# Patient Record
Sex: MALE | Race: WHITE | NOT HISPANIC OR LATINO | Employment: UNEMPLOYED | URBAN - METROPOLITAN AREA
[De-identification: names, ages, dates, MRNs, and addresses within clinical notes are randomized per-mention and may not be internally consistent; named-entity substitution may affect disease eponyms.]

---

## 2019-07-10 ENCOUNTER — HOSPITAL ENCOUNTER (EMERGENCY)
Facility: HOSPITAL | Age: 34
Discharge: HOME | End: 2019-07-10
Attending: EMERGENCY MEDICINE | Admitting: EMERGENCY MEDICINE

## 2019-07-10 VITALS
HEIGHT: 75 IN | BODY MASS INDEX: 18.65 KG/M2 | RESPIRATION RATE: 16 BRPM | DIASTOLIC BLOOD PRESSURE: 84 MMHG | HEART RATE: 90 BPM | WEIGHT: 150 LBS | SYSTOLIC BLOOD PRESSURE: 128 MMHG | OXYGEN SATURATION: 99 % | TEMPERATURE: 97.8 F

## 2019-07-10 DIAGNOSIS — F31.9 BIPOLAR AFFECTIVE DISORDER, REMISSION STATUS UNSPECIFIED (HCC): Primary | ICD-10-CM

## 2019-07-10 PROCEDURE — 99282 EMERGENCY DEPT VISIT SF MDM: CPT

## 2019-07-10 RX ORDER — LITHIUM CARBONATE 150 MG/1
450 CAPSULE ORAL 2 TIMES DAILY
Qty: 180 CAPSULE | Refills: 0 | Status: SHIPPED | OUTPATIENT
Start: 2019-07-10 | End: 2019-10-21 | Stop reason: SDUPTHER

## 2019-07-10 RX ORDER — CHLORPROMAZINE HYDROCHLORIDE 100 MG/1
100 TABLET, FILM COATED ORAL DAILY
COMMUNITY
End: 2019-07-10 | Stop reason: SDUPTHER

## 2019-07-10 RX ORDER — HYDROXYZINE PAMOATE 50 MG/1
50 CAPSULE ORAL 4 TIMES DAILY
Qty: 120 CAPSULE | Refills: 0 | Status: SHIPPED | OUTPATIENT
Start: 2019-07-10 | End: 2019-10-21 | Stop reason: SDUPTHER

## 2019-07-10 RX ORDER — CHLORPROMAZINE HYDROCHLORIDE 100 MG/1
100 TABLET, FILM COATED ORAL DAILY
Qty: 30 TABLET | Refills: 0 | Status: SHIPPED | OUTPATIENT
Start: 2019-07-10 | End: 2019-10-21 | Stop reason: SDUPTHER

## 2019-07-10 RX ORDER — GABAPENTIN 300 MG/1
300 CAPSULE ORAL 3 TIMES DAILY
Qty: 90 CAPSULE | Refills: 0 | Status: SHIPPED | OUTPATIENT
Start: 2019-07-10 | End: 2019-10-21 | Stop reason: SDUPTHER

## 2019-07-10 RX ORDER — GABAPENTIN 300 MG/1
300 CAPSULE ORAL 3 TIMES DAILY
COMMUNITY
End: 2019-07-10 | Stop reason: SDUPTHER

## 2019-07-10 RX ORDER — LITHIUM CARBONATE 300 MG/1
450 CAPSULE ORAL 2 TIMES DAILY WITH MEALS
COMMUNITY
End: 2019-07-10 | Stop reason: SDUPTHER

## 2019-07-10 NOTE — ED TRIAGE NOTES
"Pt states \"I want to talk to someone about my psych meds. Something needs to change.\" Pt denies SI/HI.   "

## 2019-07-11 NOTE — ED PROVIDER NOTES
EMERGENCY DEPARTMENT ENCOUNTER    CHIEF COMPLAINT  Chief Complaint: psychiatric medication refill  History given by: patient  History limited by: none  Room Number: 40/40  PMD: Giovanna Torres APRN      HPI:  Pt is a 33 y.o. male who presents for a psychiatric medication refill. He reports that his psychiatrist is out of town and he currently has not refills. Reports that he currently takes gabapentin, vistaril, lithium, and chlorpromazine. Denies current HI/SI, CP, SOA, abd pain, N/V/D. Pt admits to smoking, has not consumed EtOH in 80 day and denies illicit drug use. There are no other complaints.    PAST MEDICAL HISTORY  Active Ambulatory Problems     Diagnosis Date Noted   • No Active Ambulatory Problems     Resolved Ambulatory Problems     Diagnosis Date Noted   • No Resolved Ambulatory Problems     Past Medical History:   Diagnosis Date   • Bipolar disorder (CMS/HCC)    • Explosive personality disorder (CMS/HCC)    • Paranoid schizophrenia (CMS/HCC)        PAST SURGICAL HISTORY  History reviewed. No pertinent surgical history.    FAMILY HISTORY  History reviewed. No pertinent family history.    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    REVIEW OF SYSTEMS  Review of Systems   Constitutional: Negative.  Negative for fever.        (+) psychiatric medication refill   HENT: Negative.  Negative for sore throat.    Eyes: Negative.    Respiratory: Negative.  Negative for cough.    Cardiovascular: Negative.  Negative for chest pain.   Gastrointestinal: Negative.     Genitourinary: Negative.  Negative for dysuria.   Musculoskeletal: Negative.  Negative for back pain.   Skin: Negative.  Negative for rash.   Neurological: Negative.  Negative for headaches.   All other systems reviewed and are negative.      PHYSICAL EXAM  ED Triage Vitals   Temp Heart Rate Resp BP SpO2   07/10/19 1950 07/10/19 1950 07/10/19 1950 07/10/19 2000 07/10/19 1950   97.8 °F (36.6 °C) 90 16 128/84 99 %      Temp src Heart Rate Source Patient Position BP Location FiO2 (%)   07/10/19 1950 07/10/19 1950 -- -- --   Tympanic Monitor          Physical Exam   Constitutional: No distress.   HENT:   Head: Normocephalic and atraumatic.   Eyes: Conjunctivae are normal.   Neck: Normal range of motion.   Cardiovascular: Normal rate and regular rhythm.   Pulmonary/Chest: Breath sounds normal. No respiratory distress.   Abdominal: There is no tenderness.   Musculoskeletal: He exhibits no edema or tenderness.   Neurological: He is alert.   Skin: No rash noted.   Nursing note and vitals reviewed.      PROCEDURES  Procedures        PROGRESS AND CONSULTS     2011- Ordered Psych/[facility] consultation.     2029- Informed by RN that pt has decided that he does not want to speak to [facility]. Further denies SI/HI. Will d/c pt home with refill for his medications. I have informed the pt of this plan and he is in agreement. I have advised the pt that he needs to f/u with his psychiatrist for further refills. RTED instructions given.  All questions were answered.     MEDICAL DECISION MAKING  Results were reviewed/discussed with the patient and they were also made aware of online access. Pt also made aware that some labs, such as cultures, will not be resulted during ER visit and follow up with PMD is necessary.     MDM  Number of Diagnoses or Management Options  Patient Progress  Patient progress: stable         DIAGNOSIS  Final diagnoses:   Bipolar affective disorder, remission status unspecified (CMS/[facility])        DISPOSITION  DISCHARGE    Patient discharged in stable condition.    Reviewed implications of results, diagnosis, meds, responsibility to follow up, warning signs and symptoms of possible worsening, potential complications and reasons to return to ER.    Patient/Family voiced understanding of above instructions.    Discussed plan for discharge, as there is no emergent indication for admission. Patient referred to primary care provider for BP management due to today's BP. Pt/family is agreeable and understands need for follow up and repeat testing.  Pt is aware that discharge does not mean that nothing is wrong but it indicates no emergency is present that requires admission and they must continue care with follow-up as given below or physician of their choice.     FOLLOW-UP  Giovanna Torres, APRN  [address]  [place] [address]  [phone]      As needed         Medication List      Changed    hydrOXYzine pamoate 50 MG capsule  Commonly known as:  VISTARIL  Take 1 capsule by mouth 4 (Four) Times a Day.  What changed:    medication strength  how much to take     lithium carbonate 150 MG capsule  Take 3 capsules by mouth 2 (Two) Times a Day.  What changed:    medication strength  when to take this          Latest Documented Vital Signs:  As of 8:35 PM  BP- 128/84 HR- 90 Temp- 97.8 °F (36.6 °C) (Tympanic) O2 sat- 99%    --  Documentation assistance provided by sarah Martinez for Dr. Jose Raul MD.  Information recorded by the scribe was done at my direction and has been verified and validated by me.     Jorge Martinez  07/10/19 2035       Anil Blunt MD  07/10/19 2038

## 2019-07-11 NOTE — DISCHARGE INSTRUCTIONS
These contact your psychiatrist for further refills and evaluation.  We will not be able to refill further medications from the emergency department

## 2019-10-21 ENCOUNTER — HOSPITAL ENCOUNTER (EMERGENCY)
Facility: HOSPITAL | Age: 34
Discharge: HOME | End: 2019-10-21
Attending: EMERGENCY MEDICINE | Admitting: EMERGENCY MEDICINE

## 2019-10-21 VITALS
OXYGEN SATURATION: 100 % | RESPIRATION RATE: 16 BRPM | TEMPERATURE: 98.2 F | DIASTOLIC BLOOD PRESSURE: 83 MMHG | HEART RATE: 90 BPM | BODY MASS INDEX: 17.03 KG/M2 | WEIGHT: 137 LBS | SYSTOLIC BLOOD PRESSURE: 122 MMHG | HEIGHT: 75 IN

## 2019-10-21 DIAGNOSIS — F31.9 BIPOLAR 1 DISORDER (HCC): ICD-10-CM

## 2019-10-21 DIAGNOSIS — F60.3 EXPLOSIVE PERSONALITY DISORDER (HCC): ICD-10-CM

## 2019-10-21 DIAGNOSIS — Z76.0 MEDICATION REFILL: Primary | ICD-10-CM

## 2019-10-21 PROCEDURE — 99283 EMERGENCY DEPT VISIT LOW MDM: CPT

## 2019-10-21 RX ORDER — CHLORPROMAZINE HYDROCHLORIDE 100 MG/1
100 TABLET, FILM COATED ORAL DAILY
Qty: 30 TABLET | Refills: 0 | Status: SHIPPED | OUTPATIENT
Start: 2019-10-21

## 2019-10-21 RX ORDER — HYDROXYZINE PAMOATE 50 MG/1
50 CAPSULE ORAL 4 TIMES DAILY
Qty: 120 CAPSULE | Refills: 0 | Status: SHIPPED | OUTPATIENT
Start: 2019-10-21

## 2019-10-21 RX ORDER — LITHIUM CARBONATE 150 MG/1
450 CAPSULE ORAL 2 TIMES DAILY
Qty: 180 CAPSULE | Refills: 0 | Status: SHIPPED | OUTPATIENT
Start: 2019-10-21

## 2019-10-21 RX ORDER — GABAPENTIN 300 MG/1
300 CAPSULE ORAL 3 TIMES DAILY
Qty: 90 CAPSULE | Refills: 0 | Status: SHIPPED | OUTPATIENT
Start: 2019-10-21

## 2019-10-21 NOTE — ED PROVIDER NOTES
Patient presents to the ED needing medication refill for bipolar disorder. Pt reports he will run out of medication in 3 days. Pt has not been able to see his regular psychiatrist due to not being an intensive outpatient. Pt reports going to [facility] today but must see two therapists before seeing a psychiatrist.       On exam,   General: Awake, alert, no acute distress  HEENT: EOMI  Pulm: Symmetric chest rise, nonlabored breathing, CTAB  CV: Regular rate and rhythm  GI: Non-distended  MSK: No deformity  Skin: Warm, dry  Neuro: Alert and oriented x 3, moving all extremities, no focal deficits  Psych: Calm, cooperative    Vital signs and nursing notes reviewed.   .      I agree with the plan to refill medication 1x.        MD ATTESTATION NOTE    The STACI and I have discussed this patient's history, physical exam, and treatment plan.  I have reviewed the documentation and personally had a face to face interaction with the patient. I affirm the documentation and agree with the treatment and plan.  The attached note describes my personal findings.      Documentation assistance provided by sarah Peraza for Dr. Estrada.  Information recorded by the scribe was done at my direction and has been verified and validated by me.     Neda ePraza  10/21/19 1411       Nick Estrada MD  10/21/19 1417

## 2019-10-21 NOTE — ED PROVIDER NOTES
EMERGENCY DEPARTMENT ENCOUNTER    Room Number:  20/20  Date seen:  10/21/2019  Time seen: 1:07 PM  PCP: Giovanna Torres APRN    HPI:  Chief complaint: Medication refill  Context:Albert Cunningham is a 34 y.o. male who presents to the ED stating that he needs refills of Gabapentin, Lithium, Thorazine, and Vistaril. He has been out of Gabapentin for 3 months and has 4-5 days left of his other medications. Pt was prescribed these medications while at [facility] and states they will not refill them since he is no longer their care. Pt went to [facility] today and was told be can see their psychiatrist after he has two visit with their therapist. He has no complaints in the ED today. He denies SI and HI. Pt admits to marijuana use, but denies other illicit drug or alcohol use. Pt has a h/o bipolar disorder, paranoid schizophrenia, and explosive personality disorder.     Location: medications   Duration: has been out of gabapentin for 3 months  Timing: constant   Character: no symptoms   Aggravating Factors: n/a   Alleviating Factors: n/a  Severity: moderate     ALLERGIES  Patient has no known allergies.    PAST MEDICAL HISTORY  Active Ambulatory Problems     Diagnosis Date Noted   • No Active Ambulatory Problems     Resolved Ambulatory Problems     Diagnosis Date Noted   • No Resolved Ambulatory Problems     Past Medical History:   Diagnosis Date   • Bipolar disorder (CMS/HCC)    • Explosive personality disorder (CMS/HCC)    • Paranoid schizophrenia (CMS/HCC)        PAST SURGICAL HISTORY  No past surgical history on file.    FAMILY HISTORY  No family history on file.    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        Needs medications refilled    HENT: Negative.    Eyes: Negative.    Respiratory: Negative for shortness of breath.    Cardiovascular: Negative for chest pain.   Gastrointestinal: Negative for abdominal pain.   Genitourinary: Negative.    Musculoskeletal: Negative.    Skin: Negative.    Neurological: Negative.    Psychiatric/Behavioral: Negative.        PHYSICAL EXAM  ED Triage Vitals [10/21/19 1258]   Temp Heart Rate Resp BP SpO2   98.2 °F (36.8 °C) 110 16 -- 100 %      Temp src Heart Rate Source Patient Position BP Location FiO2 (%)   Tympanic Monitor -- -- --     Physical Exam   Constitutional: He is oriented to person, place, and time and well-developed, well-nourished, and in no distress.   HENT:   Head: Normocephalic and atraumatic.   Right Ear: External ear normal.   Left Ear: External ear normal.   Nose: Nose normal.   Eyes: Conjunctivae are normal.   Neck: Normal range of motion.   Cardiovascular: Normal rate and regular rhythm.   Pulmonary/Chest: Effort normal and breath sounds normal.   Musculoskeletal: Normal range of motion.   Neurological: He is alert and oriented to person, place, and time.   Skin: Skin is warm and dry.   Psychiatric: Affect normal. He expresses no homicidal and no suicidal ideation.   Nursing note and vitals reviewed.        MEDICATIONS GIVEN IN ER  Medications - No data to display      PROCEDURES  Procedures      PROGRESS AND CONSULTS    Progress Notes:       2:01 PM  Nicholas ordered.     2:10 PM  Reviewed pt's history and workup with Dr. Estrada.  After a bedside evaluation; Dr Estrada agrees with the plan of care    2:32 PM  NICHOLAS query complete. Treatment plan to include limited course of prescribed  controlled substance. Risks including addiction, benefits, and alternatives presented to patient.  "  Pt will be discharged with refills of his medications. I encouraged him to keep f/u with [facility] and establish care with their psychiatrist. Pt understands and agrees with the plan, all questions answered.          Disposition vitals:  Pulse 110   Temp 98.2 °F (36.8 °C) (Tympanic)   Resp 16   Ht 190.5 cm (75\")   SpO2 100%   BMI 18.75 kg/m²       DIAGNOSIS  Final diagnoses:   Medication refill   Bipolar 1 disorder (CMS/HCC)   Explosive personality disorder (CMS/HCC)       DISCHARGE    Patient discharged in stable condition.    Reviewed implications of results, diagnosis, meds, responsibility to follow up, warning signs and symptoms of possible worsening, potential complications and reasons to return to ER.    Patient/Family voiced understanding of above instructions.    Discussed plan for discharge, as there is no emergent indication for admission. Patient referred to primary care provider for BP management due to today's BP. Pt/family is agreeable and understands need for follow up and repeat testing.  Pt is aware that discharge does not mean that nothing is wrong but it indicates no emergency is present that requires admission and they must continue care with follow-up as given below or physician of their choice.     FOLLOW-UP  [facility]  [address] [place] [id number] [phone]    For follow up         Medication List      No changes were made to your prescriptions during this visit.         Documentation assistance provided by sarah Rincon for Riana Bravo PA-C.  Information recorded by the scravelino was done at my direction and has been verified and validated by me.           Minal Rincon  10/21/19 1669       Riana Bravo PA  10/24/19 1229    "

## 2019-10-21 NOTE — ED NOTES
need medication refilled gabapentin, lithium, thorazine and visteril     Marilynn Hawkins, RN  10/21/19 1619

## 2019-10-21 NOTE — DISCHARGE INSTRUCTIONS
Take your medications as prescribed.   Continue to follow up with Sandra.  Return to the ER as needed.

## 2019-11-20 ENCOUNTER — HOSPITAL ENCOUNTER (EMERGENCY)
Facility: HOSPITAL | Age: 34
Discharge: HOME | End: 2019-11-20
Attending: EMERGENCY MEDICINE | Admitting: EMERGENCY MEDICINE

## 2019-11-20 VITALS
SYSTOLIC BLOOD PRESSURE: 112 MMHG | DIASTOLIC BLOOD PRESSURE: 77 MMHG | OXYGEN SATURATION: 98 % | HEART RATE: 100 BPM | RESPIRATION RATE: 16 BRPM | WEIGHT: 140 LBS | BODY MASS INDEX: 17.5 KG/M2

## 2019-11-20 DIAGNOSIS — Z76.0 MEDICATION REFILL: Primary | ICD-10-CM

## 2019-11-20 PROCEDURE — 99282 EMERGENCY DEPT VISIT SF MDM: CPT

## 2019-11-20 RX ORDER — CHLORPROMAZINE HYDROCHLORIDE 100 MG/1
100 TABLET, FILM COATED ORAL DAILY
Qty: 21 TABLET | Refills: 0 | Status: SHIPPED | OUTPATIENT
Start: 2019-11-20

## 2019-11-20 RX ORDER — LITHIUM CARBONATE 150 MG/1
450 CAPSULE ORAL 2 TIMES DAILY WITH MEALS
Qty: 126 CAPSULE | Refills: 0 | Status: SHIPPED | OUTPATIENT
Start: 2019-11-20

## 2019-11-20 RX ORDER — HYDROXYZINE PAMOATE 50 MG/1
50 CAPSULE ORAL 4 TIMES DAILY PRN
Qty: 84 CAPSULE | Refills: 0 | Status: SHIPPED | OUTPATIENT
Start: 2019-11-20

## 2019-11-20 RX ORDER — GABAPENTIN 300 MG/1
300 CAPSULE ORAL 3 TIMES DAILY
Qty: 63 CAPSULE | Refills: 0 | Status: SHIPPED | OUTPATIENT
Start: 2019-11-20

## 2019-11-20 NOTE — ED PROVIDER NOTES
EMERGENCY DEPARTMENT ENCOUNTER    CHIEF COMPLAINT  Chief Complaint: Med refill  History given by:pt  History limited by:none  Time Seen: 1411  Room Number: 44/44  PMD: Giovanna Torres, GUNNER      HPI:  Pt is a 34 y.o. male who presents to the ED needing a medication refill. Pt states he ran out of his Thorazine, Gabapentin, Vistaril, and Lithium yesterday. He states he has been seen at [facility] for 3-4 years but recently did IOP at [facility]. Pt states he is in the process of getting set back up with [facility] but has yet to see a Psychiatrist. He states he has seen at [facility] but was told he can't get a refill until he sees Psychiatry. He is scheduled to see a Psychiatrist on December 9th. Patient has no further complaints. Pt denies HI or SI. Pt is a smoker. He denies alcohol or drug use.   Past Medical History of bipolar disorder, paranoid schizophrenia, and explosive personality disorder.       PAST MEDICAL HISTORY  Active Ambulatory Problems     Diagnosis Date Noted   • No Active Ambulatory Problems     Resolved Ambulatory Problems     Diagnosis Date Noted   • No Resolved Ambulatory Problems     Past Medical History:   Diagnosis Date   • Bipolar disorder (CMS/HCC)    • Explosive personality disorder (CMS/[facility])    • Paranoid schizophrenia (CMS/[facility])        PAST SURGICAL HISTORY  History reviewed. No pertinent surgical history.    FAMILY HISTORY  History reviewed. No pertinent family history.    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    REVIEW OF  SYSTEMS  Review of Systems   Constitutional: Negative for chills and fever.   HENT: Negative for sore throat.    Gastrointestinal: Negative for nausea and vomiting.   Genitourinary: Negative for dysuria.   Musculoskeletal: Negative for back pain.   Skin: Negative for rash.   Psychiatric/Behavioral: Negative for suicidal ideas. The patient is not nervous/anxious.        PHYSICAL EXAM  ED Triage Vitals   Temp Heart Rate Resp BP SpO2   -- 11/20/19 1338 11/20/19 1338 11/20/19 1411 11/20/19 1338    100 16 112/77 98 %         Physical Exam   Constitutional: He is well-developed, well-nourished, and in no distress. No distress.   Calm, cooperative   HENT:   Head: Atraumatic.   Mouth/Throat: Mucous membranes are normal.   Eyes: No scleral icterus.   Neck: Normal range of motion.   Cardiovascular: Normal rate, regular rhythm and normal heart sounds.   Pulmonary/Chest: Effort normal and breath sounds normal. No respiratory distress. He has no wheezes. He has no rales.   Musculoskeletal: Normal range of motion.   Neurological: He is alert.   Skin: Skin is warm and dry.   Psychiatric: Mood and affect normal. He expresses no homicidal and no suicidal ideation.   Nursing note and vitals reviewed.        PROGRESS AND CONSULTS          1420 Initial encounter. Informed the pt that I can given give a three weeks supply of his medications until he sees his Psychiatrist on December 9th. Pt understands and agrees with the plan, all concerns addressed.     1423 Reviewed pt's history and workup with Dr. Murphy.  At bedside evaluation, they agree with the plan of care.    Reviewed implications of results, diagnosis, meds, responsibility to follow up, warning signs and symptoms of possible worsening, potential complications and reasons to return to ER with patient.  Discussed all results and noted any abnormalities with patient.  Discussed absolute need to recheck abnormalities with his PCP or Psychiatry.    Discussed plan for discharge, as  there is no emergent indication for admission.  Pt is agreeable and understands need for follow up and repeat testing.  Pt is aware that discharge does not mean that nothing is wrong but it indicates no emergency is present.  Pt is discharged with instructions to follow up with primary care doctor to have their blood pressure rechecked.       DIAGNOSIS  Final diagnoses:   Medication refill       FOLLOW UP   Giovanna Torres, APRN  [address]  [address]  [place] [id number]  [phone]    Today      [facility]      keep appointment on 12/9      RX     Medication List      chlorproMAZINE 100 MG tablet  Commonly known as:  THORAZINE  Take 1 tablet by mouth Daily.    gabapentin 300 MG capsule  Commonly known as:  NEURONTIN  Take 1 capsule by mouth 3 (Three) Times a Day.       hydrOXYzine pamoate 50 MG capsule  Commonly known as:  VISTARIL  Take 1 capsule by mouth 4 (Four) Times a Day As Needed for Anxiety.      lithium carbonate 150 MG capsule  Take 3 capsules by mouth 2 (Two) Times a Day With Meals.      Bill report [id number] reviewed.  Risks, benefits, alternatives discussed with patient.  Pt consents to treatment and agrees to follow up with PMD tomorrow for further care and any other prescriptions.                 COURSE & MEDICAL DECISION MAKING   Results were reviewed/discussed with the patient and they were also made aware of online assess.     MEDICATIONS GIVEN IN ER  Medications - No data to display    /77   Pulse 100   Resp 16   Wt 63.5 kg (140 lb)   SpO2 98%   BMI 17.50 kg/m²       I personally reviewed the past medical history, past surgical history, social history, family history, current medications and allergies as they appear in this chart.  The scribe's note accurately reflects the work and decisions made by me.     Documentation assistance provided by sarah Krishnamurthy for PAULETTE Pearson on 11/20/2019 at 2:48 PM. Information recorded by the sarah was done at  my direction and has been verified and validated by me.        Jorge Krishnamurthy  11/20/19 8021       Ruth Hernandes, GUNNER  11/20/19 4083

## 2019-11-20 NOTE — DISCHARGE INSTRUCTIONS
Medications as ordered  Keep scheduled appointment at [facility] for December 9  Return to the ER for thoughts of wanting to harm yourself or others or any new or worsening symptoms

## 2019-11-20 NOTE — ED TRIAGE NOTES
Out of thorazine 100mg, gabapentin 300mg, vistaril 50mg, lithium 450mg.  States next Psych appt is 12/9.

## 2019-11-20 NOTE — ED PROVIDER NOTES
MD ATTESTATION NOTE    Patient is a 34 y.o. male who presents to the ED for medication refills. The patient reports he has been out of his Thorazine 100 mg daily, Vistaril 50 mg q6h, Gabapentin 300 mg TID, and Lithium 450 mg BID since yesterday. The patient reports his therapist is not able to refill his medications until he is seen by psychiatry, so he came to the ER. The patient reports is scheduled to see his therapist and a psychiatrist on 12/09/2019. The patient reports he has a hx of drug (heroin) abuse in the past. The patient denies current drug use. The patient reports he has a hx of bipolar disorder. The patient denies visual hallucinations, auditory hallucinations, suicidal ideations, or homicidal ideations.    Limited Physical Exam:  Constitutional: awake, alert, no acute distress  Cardiovascular: regular rhythm, regular rate, no murmur  Pulmonary/Chest: normal effort, CTAB  Abdominal: soft, non-tender, non-distended, and normal active bowel sounds  Neurological: alert, moves all extremities, follows commands  Skin: warm, dry  Vital signs and nursing notes reviewed.    Plan is to discharge the patient home with prescriptions for his medications and instructions to f/u with his therapist and psychiatrist for further evaluation and management.    The STACI and I have discussed this patient's history, physical exam, and treatment plan.  I have reviewed the documentation and personally had a face to face interaction with the patient. I affirm the documentation and agree with the treatment and plan.  The attached note describes my personal findings.    Documentation assistance provided by sarah Denise for Dr. Katherine MD.  Information recorded by the sarah was done at my direction and has been verified and validated by me.       Debi Denise  11/20/19 2814       Rajeev Murphy MD  11/20/19 6069

## 2019-12-31 ENCOUNTER — HOSPITAL (OUTPATIENT)
Dept: OTHER | Age: 34
End: 2019-12-31

## 2019-12-31 LAB
ANALYZER ANC (IANC): NORMAL
ANION GAP SERPL CALC-SCNC: 10 MMOL/L (ref 10–20)
BUN SERPL-MCNC: 11 MG/DL (ref 6–20)
BUN/CREAT SERPL: 11 (ref 7–25)
CALCIUM SERPL-MCNC: 8.9 MG/DL (ref 8.4–10.2)
CHLORIDE SERPL-SCNC: 107 MMOL/L (ref 98–107)
CO2 SERPL-SCNC: 28 MMOL/L (ref 21–32)
CREAT SERPL-MCNC: 0.99 MG/DL (ref 0.67–1.17)
ERYTHROCYTE [DISTWIDTH] IN BLOOD: 11.9 % (ref 11–15)
GLUCOSE SERPL-MCNC: 103 MG/DL (ref 65–99)
HCT VFR BLD CALC: 44.8 % (ref 39–51)
HGB BLD-MCNC: 15.3 G/DL (ref 13–17)
MCH RBC QN AUTO: 29.8 PG (ref 26–34)
MCHC RBC AUTO-ENTMCNC: 34.2 G/DL (ref 32–36.5)
MCV RBC AUTO: 87.3 FL (ref 78–100)
NRBC (NRBCRE): 0 /100 WBC
PLATELET # BLD: 301 K/MCL (ref 140–450)
POTASSIUM SERPL-SCNC: 3.9 MMOL/L (ref 3.4–5.1)
RBC # BLD: 5.13 MIL/MCL (ref 4.5–5.9)
SODIUM SERPL-SCNC: 141 MMOL/L (ref 135–145)
WBC # BLD: 8.8 K/MCL (ref 4.2–11)

## 2019-12-31 PROCEDURE — 99285 EMERGENCY DEPT VISIT HI MDM: CPT | Performed by: EMERGENCY MEDICINE
